# Patient Record
Sex: MALE | Race: WHITE | Employment: FULL TIME
[De-identification: names, ages, dates, MRNs, and addresses within clinical notes are randomized per-mention and may not be internally consistent; named-entity substitution may affect disease eponyms.]

---

## 2017-03-08 ENCOUNTER — OFFICE VISIT (OUTPATIENT)
Dept: UROLOGY | Facility: CLINIC | Age: 62
End: 2017-03-08

## 2017-03-08 VITALS
BODY MASS INDEX: 38.54 KG/M2 | WEIGHT: 261 LBS | TEMPERATURE: 97.9 F | DIASTOLIC BLOOD PRESSURE: 90 MMHG | SYSTOLIC BLOOD PRESSURE: 130 MMHG

## 2017-03-08 DIAGNOSIS — N13.8 BPH WITH OBSTRUCTION/LOWER URINARY TRACT SYMPTOMS: ICD-10-CM

## 2017-03-08 DIAGNOSIS — N40.1 BPH WITH OBSTRUCTION/LOWER URINARY TRACT SYMPTOMS: ICD-10-CM

## 2017-03-08 DIAGNOSIS — R31.29 MICROSCOPIC HEMATURIA: Primary | ICD-10-CM

## 2017-03-08 DIAGNOSIS — R35.0 FREQUENCY OF URINATION: ICD-10-CM

## 2017-03-08 PROCEDURE — 99204 OFFICE O/P NEW MOD 45 MIN: CPT | Performed by: UROLOGY

## 2017-03-08 ASSESSMENT — ENCOUNTER SYMPTOMS
NAUSEA: 0
SHORTNESS OF BREATH: 0
BACK PAIN: 0
WHEEZING: 0
VOMITING: 0
EYE PAIN: 0
EYE REDNESS: 0
ABDOMINAL PAIN: 0
COLOR CHANGE: 0
COUGH: 0

## 2017-03-13 ENCOUNTER — HOSPITAL ENCOUNTER (OUTPATIENT)
Dept: CT IMAGING | Age: 62
Discharge: HOME | End: 2017-03-13
Payer: COMMERCIAL

## 2017-03-13 DIAGNOSIS — R35.0 FREQUENT URINATION: ICD-10-CM

## 2017-03-13 PROCEDURE — 74178 CT ABD&PLV WO CNTR FLWD CNTR: CPT

## 2017-03-13 PROCEDURE — 6360000004 HC RX CONTRAST MEDICATION: Performed by: UROLOGY

## 2017-03-13 RX ADMIN — IOPAMIDOL 100 ML: 612 INJECTION, SOLUTION INTRAVENOUS at 15:24

## 2017-03-20 ENCOUNTER — PROCEDURE VISIT (OUTPATIENT)
Dept: UROLOGY | Age: 62
End: 2017-03-20
Payer: COMMERCIAL

## 2017-03-20 VITALS — DIASTOLIC BLOOD PRESSURE: 102 MMHG | BODY MASS INDEX: 38.4 KG/M2 | WEIGHT: 260 LBS | SYSTOLIC BLOOD PRESSURE: 150 MMHG

## 2017-03-20 DIAGNOSIS — R31.29 MICROHEMATURIA: Primary | ICD-10-CM

## 2017-03-20 PROCEDURE — 52000 CYSTOURETHROSCOPY: CPT | Performed by: UROLOGY

## 2017-03-20 PROCEDURE — 99213 OFFICE O/P EST LOW 20 MIN: CPT | Performed by: UROLOGY

## 2017-03-20 ASSESSMENT — ENCOUNTER SYMPTOMS
EYE REDNESS: 0
EYE PAIN: 0
WHEEZING: 0
ABDOMINAL PAIN: 0
SHORTNESS OF BREATH: 0
NAUSEA: 0
COLOR CHANGE: 0
VOMITING: 0
COUGH: 0
BACK PAIN: 0

## 2017-10-26 ENCOUNTER — APPOINTMENT (OUTPATIENT)
Dept: CT IMAGING | Age: 62
End: 2017-10-26
Payer: COMMERCIAL

## 2017-10-26 ENCOUNTER — HOSPITAL ENCOUNTER (OUTPATIENT)
Dept: ULTRASOUND IMAGING | Age: 62
Discharge: HOME | End: 2017-10-26
Payer: COMMERCIAL

## 2017-10-26 ENCOUNTER — HOSPITAL ENCOUNTER (EMERGENCY)
Age: 62
Discharge: HOME | End: 2017-10-26
Payer: COMMERCIAL

## 2017-10-26 VITALS
SYSTOLIC BLOOD PRESSURE: 161 MMHG | DIASTOLIC BLOOD PRESSURE: 90 MMHG | TEMPERATURE: 98.9 F | RESPIRATION RATE: 16 BRPM | HEART RATE: 78 BPM | OXYGEN SATURATION: 93 %

## 2017-10-26 DIAGNOSIS — N50.89 TESTICULAR MASS: ICD-10-CM

## 2017-10-26 DIAGNOSIS — K40.20 BILATERAL INGUINAL HERNIA WITHOUT OBSTRUCTION OR GANGRENE, RECURRENCE NOT SPECIFIED: Primary | ICD-10-CM

## 2017-10-26 LAB
ABSOLUTE EOS #: 0.5 K/UL (ref 0–0.4)
ABSOLUTE IMMATURE GRANULOCYTE: ABNORMAL K/UL (ref 0–0.3)
ABSOLUTE LYMPH #: 1.6 K/UL (ref 1–4.8)
ABSOLUTE MONO #: 0.5 K/UL (ref 0–1)
ALBUMIN SERPL-MCNC: 4.4 G/DL (ref 3.5–5.2)
ALBUMIN/GLOBULIN RATIO: 1.6 (ref 1–2.5)
ALP BLD-CCNC: 88 U/L (ref 40–129)
ALT SERPL-CCNC: 26 U/L (ref 5–41)
ANION GAP SERPL CALCULATED.3IONS-SCNC: 14 MMOL/L (ref 9–17)
AST SERPL-CCNC: 25 U/L
BASOPHILS # BLD: 0 %
BASOPHILS ABSOLUTE: 0 K/UL (ref 0–0.2)
BILIRUB SERPL-MCNC: 0.3 MG/DL (ref 0.3–1.2)
BUN BLDV-MCNC: 17 MG/DL (ref 8–23)
BUN/CREAT BLD: 19 (ref 9–20)
CALCIUM SERPL-MCNC: 9.5 MG/DL (ref 8.6–10.4)
CHLORIDE BLD-SCNC: 100 MMOL/L (ref 98–107)
CO2: 26 MMOL/L (ref 20–31)
CREAT SERPL-MCNC: 0.88 MG/DL (ref 0.7–1.2)
DIFFERENTIAL TYPE: ABNORMAL
EOSINOPHILS RELATIVE PERCENT: 8 %
GFR AFRICAN AMERICAN: >60 ML/MIN
GFR NON-AFRICAN AMERICAN: >60 ML/MIN
GFR SERPL CREATININE-BSD FRML MDRD: ABNORMAL ML/MIN/{1.73_M2}
GFR SERPL CREATININE-BSD FRML MDRD: ABNORMAL ML/MIN/{1.73_M2}
GLUCOSE BLD-MCNC: 148 MG/DL (ref 70–99)
HCT VFR BLD CALC: 45 % (ref 41–53)
HEMOGLOBIN: 14.8 G/DL (ref 13.5–17)
IMMATURE GRANULOCYTES: ABNORMAL %
LACTIC ACID, WHOLE BLOOD: ABNORMAL MMOL/L (ref 0.7–2.1)
LACTIC ACID: 2.7 MMOL/L (ref 0.5–2.2)
LYMPHOCYTES # BLD: 27 %
MCH RBC QN AUTO: 30.5 PG (ref 26–34)
MCHC RBC AUTO-ENTMCNC: 32.9 G/DL (ref 31–37)
MCV RBC AUTO: 92.6 FL (ref 80–100)
MONOCYTES # BLD: 8 %
PDW BLD-RTO: 13.3 % (ref 12.1–15.2)
PLATELET # BLD: 222 K/UL (ref 140–450)
PLATELET ESTIMATE: ABNORMAL
PMV BLD AUTO: 7 FL (ref 6–12)
POTASSIUM SERPL-SCNC: 4.1 MMOL/L (ref 3.7–5.3)
RBC # BLD: 4.86 M/UL (ref 4.5–5.9)
RBC # BLD: ABNORMAL 10*6/UL
SEG NEUTROPHILS: 57 %
SEGMENTED NEUTROPHILS ABSOLUTE COUNT: 3.5 K/UL (ref 1.8–7.7)
SODIUM BLD-SCNC: 140 MMOL/L (ref 135–144)
TOTAL PROTEIN: 7.1 G/DL (ref 6.4–8.3)
WBC # BLD: 6 K/UL (ref 3.5–11)
WBC # BLD: ABNORMAL 10*3/UL

## 2017-10-26 PROCEDURE — 74177 CT ABD & PELVIS W/CONTRAST: CPT

## 2017-10-26 PROCEDURE — 2580000003 HC RX 258: Performed by: PHYSICIAN ASSISTANT

## 2017-10-26 PROCEDURE — 93976 VASCULAR STUDY: CPT

## 2017-10-26 PROCEDURE — 80053 COMPREHEN METABOLIC PANEL: CPT

## 2017-10-26 PROCEDURE — 99283 EMERGENCY DEPT VISIT LOW MDM: CPT

## 2017-10-26 PROCEDURE — 6360000004 HC RX CONTRAST MEDICATION: Performed by: PHYSICIAN ASSISTANT

## 2017-10-26 PROCEDURE — 83605 ASSAY OF LACTIC ACID: CPT

## 2017-10-26 PROCEDURE — 36415 COLL VENOUS BLD VENIPUNCTURE: CPT

## 2017-10-26 PROCEDURE — 85025 COMPLETE CBC W/AUTO DIFF WBC: CPT

## 2017-10-26 RX ORDER — 0.9 % SODIUM CHLORIDE 0.9 %
1000 INTRAVENOUS SOLUTION INTRAVENOUS ONCE
Status: COMPLETED | OUTPATIENT
Start: 2017-10-26 | End: 2017-10-26

## 2017-10-26 RX ADMIN — IOPAMIDOL 100 ML: 612 INJECTION, SOLUTION INTRAVENOUS at 19:55

## 2017-10-26 RX ADMIN — SODIUM CHLORIDE 1000 ML: 9 INJECTION, SOLUTION INTRAVENOUS at 20:35

## 2017-10-26 ASSESSMENT — ENCOUNTER SYMPTOMS
SORE THROAT: 0
ABDOMINAL PAIN: 0
DIARRHEA: 0
WHEEZING: 0
COUGH: 0
CHEST TIGHTNESS: 0
RHINORRHEA: 0
EYE REDNESS: 0
BLOOD IN STOOL: 0
VOMITING: 0
SHORTNESS OF BREATH: 0
BACK PAIN: 0
CONSTIPATION: 0
NAUSEA: 0
EYE DISCHARGE: 0

## 2017-10-27 NOTE — ED PROVIDER NOTES
[facility] ED            [address] [place] [address]  Phone: [phone]  Fax: [phone]          Pt Name: Kym Morin  MRN: [id number]  Renzogfjerod 1955  Date of evaluation: 10/26/2017  Provider: Triston Lucas PA-C    [address] [facility]       Chief Complaint   Patient presents with    Other     Sent by PCP for CT scan to R/O bowel obstruction         HISTORY OF PRESENT ILLNESS   (Location/Symptom, Timing/Onset, Context/Setting, Quality, Duration, Modifying Factors, Severity)   Note limiting factors. Kym Morin is a 57 y.o. male who presents to the emergency department  At the request of outpatient due to ultrasound findings which were worried for possible incarcerated bowel in the left inguinal hernia. Patient reports that for the past month and a half he's had swelling in his groin region he had been seen by multiple providers for this. States that he is actively having bowel movements his last abdominal was this morning and normal.  States that he have his outpatient image done today and they called him and told him to come to the ER for further evaluation and CT. He otherwise reports he feels well he denies any fever chills denies any other abdominal pain denies any nausea vomiting. Denies any other complaints at this time. HPI    Nursing Notes were reviewed. REVIEW OF SYSTEMS    (2-9 systems for level 4, 10 or more for level 5)     Review of Systems   Constitutional: Negative for chills, diaphoresis and fever. HENT: Negative for congestion, ear pain, rhinorrhea and sore throat. Eyes: Negative for discharge, redness and visual disturbance. Respiratory: Negative for cough, chest tightness, shortness of breath and wheezing. Cardiovascular: Negative for chest pain and palpitations. Gastrointestinal: Negative for abdominal pain, blood in stool, constipation, diarrhea, nausea and vomiting. Endocrine: Negative for polydipsia, polyphagia and polyuria. Genitourinary: Negative for decreased urine volume, difficulty urinating, dysuria, frequency and hematuria. Musculoskeletal: Negative for arthralgias, back pain and myalgias. Skin: Negative for pallor and rash. Allergic/Immunologic: Negative for food allergies and immunocompromised state. Neurological: Negative for dizziness, syncope, weakness and light-headedness. Hematological: Negative for adenopathy. Does not bruise/bleed easily. Psychiatric/Behavioral: Negative for behavioral problems and suicidal ideas. The patient is not nervous/anxious. Except as noted above the remainder of the review of systems was reviewed and negative. PAST MEDICAL HISTORY     Past Medical History:   Diagnosis Date    Obesity 3/17/2014    BONI (obstructive sleep apnea) 3/17/2014    Unspecified sleep apnea          SURGICAL HISTORY       Past Surgical History:   Procedure Laterality Date    HERNIA REPAIR      umbilical    MOUTH SURGERY           CURRENT MEDICATIONS     There are no discharge medications for this patient. ALLERGIES     Chocolate and Milk-related compounds    FAMILY HISTORY       Family History   Problem Relation Age of Onset    Heart Disease Mother     Other Father           SOCIAL HISTORY       Social History     Social History    Marital status:      Spouse name: N/A    Number of children: N/A    Years of education: N/A     Social History Main Topics    Smoking status: Never Smoker    Smokeless tobacco: None    Alcohol use Yes      Comment: occ.     Drug use: No    Sexual activity: Not Asked     Other Topics Concern    None     Social History Narrative    None       SCREENINGS             PHYSICAL EXAM    (up to 7 for level 4, 8 or more for level 5)     ED Triage Vitals [10/26/17 1752]   BP Temp Temp Source Pulse Resp SpO2 Height Weight   (!) 180/89 98.9 °F (37.2 °C) Tympanic 78 16 93 % -- --       Physical Exam   Constitutional: He is oriented to person, place, and time. He appears well-developed and well-nourished. No distress. HENT:   Head: Normocephalic and atraumatic. Right Ear: External ear normal.   Left Ear: External ear normal.   Mouth/Throat: Oropharynx is clear and moist. No oropharyngeal exudate. Eyes: Conjunctivae and EOM are normal. Pupils are equal, round, and reactive to light. Right eye exhibits no discharge. Left eye exhibits no discharge. No scleral icterus. Neck: Normal range of motion. Neck supple. No tracheal deviation present. No thyromegaly present. Cardiovascular: Normal rate, regular rhythm and intact distal pulses. Exam reveals no gallop and no friction rub. No murmur heard. Pulmonary/Chest: Effort normal and breath sounds normal. No stridor. No respiratory distress. He has no wheezes. Abdominal: Soft. Bowel sounds are normal. He exhibits no distension. There is no rebound and no guarding. A hernia is present. Hernia confirmed positive in the left inguinal area. There is a left inguinal hernia palpated. I am able to reduce his somewhat but I do feel the hard mass that ultrasound was seen. This is the only area of tenderness the patient. Other wise abdominal exam is benign. Musculoskeletal: Normal range of motion. He exhibits no edema, tenderness or deformity. Lymphadenopathy:     He has no cervical adenopathy. Neurological: He is alert and oriented to person, place, and time. No cranial nerve deficit. Skin: Skin is warm and dry. No rash noted. He is not diaphoretic. No erythema. Psychiatric: He has a normal mood and affect. His behavior is normal.   Nursing note and vitals reviewed.       DIAGNOSTIC RESULTS     EKG: All EKG's are interpreted by the Emergency Department Physician who either signs or Co-signs this chart in the absence of a cardiologist.      RADIOLOGY:   Non-plain film images such as CT, Ultrasound and MRI are read by the radiologist. Plain radiographic images are visualized and preliminarily interpreted by the emergency physician with the below findings:      Interpretation per the Radiologist below, if available at the time of this note:    CT ABDOMEN PELVIS W IV CONTRAST Additional Contrast? Oral   Final Result   Impression:     1. Bilateral inguinal hernias containing only fat, larger on the left, extending into the left hemiscrotum. Left varicocele also evident. 2. Other nonacute findings above. Electronically Signed By: Frank Odell   on  10/26/2017  20:23        Impression   Left scrotal hernia mostly containing fat. There is a more hypoechoic area more in the inguinal canal that is hard and tender without peristalsis. Consider an incarcerated loop of bowel. Recommend a CT scan of the pelvis for further    evaluation. ED BEDSIDE ULTRASOUND:   Performed by ED Physician - none    LABS:  Labs Reviewed   CBC WITH AUTO DIFFERENTIAL - Abnormal; Notable for the following:        Result Value    Absolute Eos # 0.50 (*)     All other components within normal limits   COMPREHENSIVE METABOLIC PANEL - Abnormal; Notable for the following:     Glucose 148 (*)     All other components within normal limits   LACTIC ACID, PLASMA - Abnormal; Notable for the following:     Lactic Acid 2.7 (*)     All other components within normal limits       All other labs were within normal range or not returned as of this dictation. EMERGENCY DEPARTMENT COURSE and DIFFERENTIAL DIAGNOSIS/MDM:   Vitals:    Vitals:    10/26/17 1752 10/26/17 1923 10/26/17 2133   BP: (!) 180/89 (!) 133/90 (!) 161/90   Pulse: 78     Resp: 16     Temp: 98.9 °F (37.2 °C)     TempSrc: Tympanic     SpO2: 93%           MDM  15-year-old male who presents after an outpatient ultrasound was worried for a possible incarcerated hernia. On exam I do feel the palpable mass in his inguinal region. He does have some mild left scrotal swelling. But there is no erythema no evidence of torsion outpatient ultrasound just had was negative for torsion. DERICK  10/26/17 1 [facility] DERICK Stewart  10/26/17 8260

## 2017-11-29 ENCOUNTER — HOSPITAL ENCOUNTER (OUTPATIENT)
Age: 62
Setting detail: SPECIMEN
Discharge: HOME | End: 2017-11-29
Payer: COMMERCIAL

## 2017-11-29 ENCOUNTER — OFFICE VISIT (OUTPATIENT)
Dept: UROLOGY | Age: 62
End: 2017-11-29
Payer: COMMERCIAL

## 2017-11-29 VITALS
WEIGHT: 267 LBS | SYSTOLIC BLOOD PRESSURE: 142 MMHG | BODY MASS INDEX: 40.47 KG/M2 | DIASTOLIC BLOOD PRESSURE: 78 MMHG | TEMPERATURE: 98.2 F | HEIGHT: 68 IN

## 2017-11-29 DIAGNOSIS — N50.89 SCROTAL MASS: ICD-10-CM

## 2017-11-29 DIAGNOSIS — N50.89 SCROTAL MASS: Primary | ICD-10-CM

## 2017-11-29 DIAGNOSIS — K40.20 BILATERAL INGUINAL HERNIA WITHOUT OBSTRUCTION OR GANGRENE, RECURRENCE NOT SPECIFIED: ICD-10-CM

## 2017-11-29 LAB
-: ABNORMAL
AMORPHOUS: ABNORMAL
BACTERIA: ABNORMAL
BILIRUBIN URINE: NEGATIVE
CASTS UA: ABNORMAL /LPF
COLOR: YELLOW
COMMENT UA: ABNORMAL
CRYSTALS, UA: ABNORMAL /HPF
EPITHELIAL CELLS UA: ABNORMAL /HPF (ref 0–5)
GLUCOSE URINE: NEGATIVE
KETONES, URINE: NEGATIVE
LEUKOCYTE ESTERASE, URINE: NEGATIVE
MUCUS: ABNORMAL
NITRITE, URINE: NEGATIVE
OTHER OBSERVATIONS UA: ABNORMAL
PH UA: 5.5 (ref 5–9)
PROTEIN UA: NEGATIVE
RBC UA: ABNORMAL /HPF (ref 0–2)
RENAL EPITHELIAL, UA: ABNORMAL /HPF
SPECIFIC GRAVITY UA: 1.02 (ref 1.01–1.02)
TRICHOMONAS: ABNORMAL
TURBIDITY: CLEAR
URINE HGB: ABNORMAL
UROBILINOGEN, URINE: NORMAL
WBC UA: ABNORMAL /HPF (ref 0–5)
YEAST: ABNORMAL

## 2017-11-29 PROCEDURE — 99213 OFFICE O/P EST LOW 20 MIN: CPT | Performed by: UROLOGY

## 2017-11-29 PROCEDURE — 81001 URINALYSIS AUTO W/SCOPE: CPT

## 2017-11-29 PROCEDURE — 87086 URINE CULTURE/COLONY COUNT: CPT

## 2017-11-29 RX ORDER — LOSARTAN POTASSIUM AND HYDROCHLOROTHIAZIDE 12.5; 5 MG/1; MG/1
TABLET ORAL
Refills: 0 | COMMUNITY
Start: 2017-11-26 | End: 2018-03-21 | Stop reason: ALTCHOICE

## 2017-11-29 RX ORDER — TAMSULOSIN HYDROCHLORIDE 0.4 MG/1
CAPSULE ORAL
Refills: 0 | COMMUNITY
Start: 2017-11-26 | End: 2018-09-26 | Stop reason: ALTCHOICE

## 2017-11-29 ASSESSMENT — ENCOUNTER SYMPTOMS
SHORTNESS OF BREATH: 0
COLOR CHANGE: 0
BACK PAIN: 0
EYE PAIN: 0
COUGH: 0
ABDOMINAL PAIN: 0
EYE REDNESS: 0
NAUSEA: 0
VOMITING: 0
WHEEZING: 0

## 2017-11-29 NOTE — PROGRESS NOTES
medications on file prior to visit. No outpatient encounter prescriptions on file as of 11/29/2017. No facility-administered encounter medications on file as of 11/29/2017. Chocolate and Milk-related compounds  History   Smoking Status    Never Smoker   Smokeless Tobacco    Not on file       History   Alcohol Use    Yes     Comment: occ. There were no vitals filed for this visit. Constitutional: Patient in no acute distress; Neuro: alert and oriented to person place and time. Psych: Mood and affect normal.  Skin: Normal  Lungs: Respiratory effort normal  Cardiovascular:  Normal peripheral pulses  Abdomen: Soft, non-tender, non-distended with no CVA, flank pain, hepatosplenomegaly or hernia. Kidneys normal.  Bladder non-tender and not distended. Lymphatics: no palpable lymphadenopathy  Penis normal  Urethral meatus normal  Scrotal exam normal  Testicles normal bilaterally  Epididymis normal bilaterally  No evidence of inguinal hernia  Anus and perineum normal  Normal rectal tone with no masses  Prostate soft, non-tender to palpation. No palpable nodules. Estimated 40 grams. Seminal vesicles not palpable. No results found for: PSA  Lab Results   Component Value Date    BUN 17 10/26/2017     Lab Results   Component Value Date    CREATININE 0.88 10/26/2017       No results found for this visit on 11/29/17. Review of Systems   Constitutional: Negative for appetite change, chills and fever. Eyes: Negative for pain, redness and visual disturbance. Respiratory: Negative for cough, shortness of breath and wheezing. Cardiovascular: Negative for chest pain and leg swelling. Gastrointestinal: Negative for abdominal pain, nausea and vomiting. Genitourinary: Positive for testicular pain. Negative for difficulty urinating, discharge, dysuria, flank pain, frequency, hematuria and scrotal swelling. Musculoskeletal: Negative for back pain, joint swelling and myalgias.    Skin:

## 2017-12-01 LAB
CULTURE: NO GROWTH
CULTURE: NORMAL
Lab: NORMAL
Lab: NORMAL
SPECIMEN DESCRIPTION: NORMAL
SPECIMEN DESCRIPTION: NORMAL
STATUS: NORMAL

## 2018-03-21 ENCOUNTER — OFFICE VISIT (OUTPATIENT)
Dept: UROLOGY | Age: 63
End: 2018-03-21
Payer: COMMERCIAL

## 2018-03-21 VITALS — WEIGHT: 274 LBS | BODY MASS INDEX: 41.66 KG/M2 | SYSTOLIC BLOOD PRESSURE: 120 MMHG | DIASTOLIC BLOOD PRESSURE: 80 MMHG

## 2018-03-21 DIAGNOSIS — N13.8 BPH WITH OBSTRUCTION/LOWER URINARY TRACT SYMPTOMS: ICD-10-CM

## 2018-03-21 DIAGNOSIS — N40.1 BPH WITH OBSTRUCTION/LOWER URINARY TRACT SYMPTOMS: ICD-10-CM

## 2018-03-21 DIAGNOSIS — R31.29 MICROHEMATURIA: Primary | ICD-10-CM

## 2018-03-21 PROCEDURE — 99213 OFFICE O/P EST LOW 20 MIN: CPT | Performed by: UROLOGY

## 2018-03-21 RX ORDER — DUTASTERIDE 0.5 MG/1
CAPSULE, LIQUID FILLED ORAL
COMMUNITY
Start: 2018-02-20 | End: 2018-09-26 | Stop reason: ALTCHOICE

## 2018-03-21 RX ORDER — LOSARTAN POTASSIUM 50 MG/1
TABLET ORAL
COMMUNITY
Start: 2018-02-20 | End: 2018-09-26 | Stop reason: ALTCHOICE

## 2018-03-21 RX ORDER — DOXYCYCLINE 50 MG/1
50 CAPSULE ORAL DAILY
COMMUNITY
Start: 2018-02-20

## 2018-03-21 ASSESSMENT — ENCOUNTER SYMPTOMS
WHEEZING: 0
ABDOMINAL PAIN: 0
BACK PAIN: 0
NAUSEA: 0
EYE REDNESS: 0
SHORTNESS OF BREATH: 0
COUGH: 0
COLOR CHANGE: 0
VOMITING: 0
EYE PAIN: 0

## 2018-03-21 NOTE — PROGRESS NOTES
Onset    Heart Disease Mother     Other Father      Current Outpatient Prescriptions on File Prior to Visit   Medication Sig Dispense Refill    tamsulosin (FLOMAX) 0.4 MG capsule   0     No current facility-administered medications on file prior to visit. Outpatient Encounter Prescriptions as of 3/21/2018   Medication Sig Dispense Refill    losartan (COZAAR) 50 MG tablet       doxycycline monohydrate (MONODOX) 50 MG capsule       dutasteride (AVODART) 0.5 MG capsule       tamsulosin (FLOMAX) 0.4 MG capsule   0    [DISCONTINUED] losartan-hydrochlorothiazide (HYZAAR) 50-12.5 MG per tablet   0     No facility-administered encounter medications on file as of 3/21/2018. Chocolate and Milk-related compounds  History   Smoking Status    Never Smoker   Smokeless Tobacco    Never Used       History   Alcohol Use    Yes     Comment: occ. Vitals:    03/21/18 1545   BP: 120/80       Constitutional: Patient in no acute distress; Neuro: alert and oriented to person place and time. Psych: Mood and affect normal.  Skin: Normal  Lungs: Respiratory effort normal  Cardiovascular:  Normal peripheral pulses  Abdomen: Soft, non-tender, non-distended with no CVA, flank pain, hepatosplenomegaly or hernia. Kidneys normal.  Bladder non-tender and not distended. Lymphatics: no palpable lymphadenopathy  Penis normal  Urethral meatus normal  Scrotal exam normal  Testicles normal bilaterally  Epididymis normal bilaterally  No evidence of inguinal hernia    No results found for: PSA  Lab Results   Component Value Date    BUN 17 10/26/2017     Lab Results   Component Value Date    CREATININE 0.88 10/26/2017       No results found for this visit on 03/21/18. Review of Systems   Constitutional: Negative for appetite change, chills and fever. Eyes: Negative for pain, redness and visual disturbance. Respiratory: Negative for cough, shortness of breath and wheezing.     Cardiovascular: Negative for chest pain and

## 2018-09-26 ENCOUNTER — HOSPITAL ENCOUNTER (OUTPATIENT)
Age: 63
Setting detail: SPECIMEN
Discharge: HOME | End: 2018-09-26
Payer: COMMERCIAL

## 2018-09-26 ENCOUNTER — OFFICE VISIT (OUTPATIENT)
Dept: UROLOGY | Age: 63
End: 2018-09-26
Payer: COMMERCIAL

## 2018-09-26 VITALS
DIASTOLIC BLOOD PRESSURE: 90 MMHG | BODY MASS INDEX: 42.44 KG/M2 | WEIGHT: 280 LBS | HEIGHT: 68 IN | SYSTOLIC BLOOD PRESSURE: 128 MMHG

## 2018-09-26 DIAGNOSIS — N40.1 BPH WITH OBSTRUCTION/LOWER URINARY TRACT SYMPTOMS: Primary | ICD-10-CM

## 2018-09-26 DIAGNOSIS — R31.29 MICROHEMATURIA: ICD-10-CM

## 2018-09-26 DIAGNOSIS — N40.1 BPH WITH OBSTRUCTION/LOWER URINARY TRACT SYMPTOMS: ICD-10-CM

## 2018-09-26 DIAGNOSIS — N50.89 SCROTAL SWELLING: ICD-10-CM

## 2018-09-26 DIAGNOSIS — N13.8 BPH WITH OBSTRUCTION/LOWER URINARY TRACT SYMPTOMS: ICD-10-CM

## 2018-09-26 DIAGNOSIS — N13.8 BPH WITH OBSTRUCTION/LOWER URINARY TRACT SYMPTOMS: Primary | ICD-10-CM

## 2018-09-26 LAB
-: ABNORMAL
AMORPHOUS: ABNORMAL
BACTERIA: ABNORMAL
BILIRUBIN URINE: NEGATIVE
CASTS UA: ABNORMAL /LPF
COLOR: YELLOW
COMMENT UA: ABNORMAL
CRYSTALS, UA: ABNORMAL /HPF
EPITHELIAL CELLS UA: ABNORMAL /HPF (ref 0–5)
GLUCOSE URINE: NEGATIVE
KETONES, URINE: NEGATIVE
LEUKOCYTE ESTERASE, URINE: NEGATIVE
MUCUS: ABNORMAL
NITRITE, URINE: NEGATIVE
OTHER OBSERVATIONS UA: ABNORMAL
PH UA: 6 (ref 5–9)
PROTEIN UA: NEGATIVE
RBC UA: ABNORMAL /HPF (ref 0–2)
RENAL EPITHELIAL, UA: ABNORMAL /HPF
SPECIFIC GRAVITY UA: 1.02 (ref 1.01–1.02)
TRICHOMONAS: ABNORMAL
TURBIDITY: CLEAR
URINE HGB: ABNORMAL
UROBILINOGEN, URINE: NORMAL
WBC UA: ABNORMAL /HPF (ref 0–5)
YEAST: ABNORMAL

## 2018-09-26 PROCEDURE — 99214 OFFICE O/P EST MOD 30 MIN: CPT | Performed by: NURSE PRACTITIONER

## 2018-09-26 PROCEDURE — 87086 URINE CULTURE/COLONY COUNT: CPT

## 2018-09-26 PROCEDURE — 81001 URINALYSIS AUTO W/SCOPE: CPT

## 2018-09-26 PROCEDURE — 51798 US URINE CAPACITY MEASURE: CPT | Performed by: NURSE PRACTITIONER

## 2018-09-26 RX ORDER — FINASTERIDE 5 MG/1
5 TABLET, FILM COATED ORAL DAILY
Qty: 90 TABLET | Refills: 3 | Status: SHIPPED | OUTPATIENT
Start: 2018-09-26 | End: 2019-04-15 | Stop reason: SDUPTHER

## 2018-09-26 RX ORDER — MELOXICAM 15 MG/1
15 TABLET ORAL DAILY
COMMUNITY

## 2018-09-26 RX ORDER — LEVOFLOXACIN 500 MG/1
500 TABLET, FILM COATED ORAL DAILY
Qty: 10 TABLET | Refills: 0 | Status: SHIPPED | OUTPATIENT
Start: 2018-09-26 | End: 2018-10-06

## 2018-09-26 RX ORDER — AMLODIPINE BESYLATE 5 MG/1
5 TABLET ORAL DAILY
COMMUNITY

## 2018-09-26 NOTE — PROGRESS NOTES
Subjective:      Patient ID: Judith Calvo is a 57 y.o. male. HPI  Patient is a 57 y.o. male in no acute distress and alert and oriented to person, place and time. History  12/2017 Bilateral inguinal hernia surgery at [address] 12/2017. Scrotal swelling afterwards. 3/2017 microscopic hematuria. Denies history of smoking. Denies family or personal history of bladder or prostate cancer. Recent PSA was 0.32 11/2016. 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 Does have baseline LUTS of frequency, but he did admit to a large consumption of bladder irritants. CT negative. Cystoscopy showed: bilobar hyperplasia, mild trabeculation, without lesions. Prostatic varices. Today  Here today for BPH with LUTS and left scrotal swelling. He does complain of frequency every two hours,Feelings of incomplete bladder emptying and nocturia ×2-3. He does consume a large amount of bladder irritants daily. He does continue to take finasteride daily. He also complains of left scrotal swelling. He feels that this has become larger since his last visit. He continues to deny pain in the testicle. He denies dysuria, gross hematuria, flank or abdominal pain. He denies perineal pain. He denies constipation. IPSS Questionnaire (AUA-7): Over the past month    1)  How often have you had a sensation of not emptying your bladder completely after you finish urinating? 5 - Almost always   2)  How often have you had to urinate again less than two hours after you finished urinating? 3 - About half the time   3)  How often have you found you stopped and started again several times when you urinated? 0 - More than half the time   4) How difficult have you found it to postpone urination? 4 - More than half the time   5) How often have you had a weak urinary stream?  0 - Not at all   6) How often have you had to push or strain to begin urination?   0 - Not at all   7) How many times did you most typically get up to urinate from the time

## 2018-09-27 LAB
CULTURE: NORMAL
Lab: NORMAL
SPECIMEN DESCRIPTION: NORMAL
STATUS: NORMAL

## 2018-09-27 ASSESSMENT — ENCOUNTER SYMPTOMS
COLOR CHANGE: 0
NAUSEA: 0
BACK PAIN: 0
EYE REDNESS: 0
CONSTIPATION: 0
COUGH: 0
VOMITING: 0
ABDOMINAL PAIN: 0
SHORTNESS OF BREATH: 0
EYE PAIN: 0
WHEEZING: 0

## 2018-09-28 ENCOUNTER — TELEPHONE (OUTPATIENT)
Dept: UROLOGY | Age: 63
End: 2018-09-28

## 2018-10-01 ENCOUNTER — HOSPITAL ENCOUNTER (OUTPATIENT)
Dept: ULTRASOUND IMAGING | Age: 63
Discharge: HOME | End: 2018-10-03
Payer: COMMERCIAL

## 2018-10-01 DIAGNOSIS — N50.89 SCROTAL SWELLING: ICD-10-CM

## 2018-10-01 PROCEDURE — 93976 VASCULAR STUDY: CPT

## 2018-10-15 ENCOUNTER — OFFICE VISIT (OUTPATIENT)
Dept: UROLOGY | Age: 63
End: 2018-10-15
Payer: COMMERCIAL

## 2018-10-15 VITALS — DIASTOLIC BLOOD PRESSURE: 88 MMHG | BODY MASS INDEX: 42.57 KG/M2 | WEIGHT: 280 LBS | SYSTOLIC BLOOD PRESSURE: 160 MMHG

## 2018-10-15 DIAGNOSIS — N43.0 ENCYSTED HYDROCELE: Primary | ICD-10-CM

## 2018-10-15 PROCEDURE — 99214 OFFICE O/P EST MOD 30 MIN: CPT | Performed by: UROLOGY

## 2018-10-15 ASSESSMENT — ENCOUNTER SYMPTOMS
SHORTNESS OF BREATH: 0
WHEEZING: 0
NAUSEA: 0
EYE REDNESS: 0
VOMITING: 0
COLOR CHANGE: 0
ABDOMINAL PAIN: 0
BACK PAIN: 0
COUGH: 0
EYE PAIN: 0

## 2018-10-15 NOTE — PROGRESS NOTES
discharge, dysuria, flank pain, frequency, hematuria and testicular pain. Musculoskeletal: Negative for back pain, joint swelling and myalgias. Skin: Negative for color change, rash and wound. Neurological: Negative for dizziness, tremors and numbness. Hematological: Negative for adenopathy. Does not bruise/bleed easily. Objective:   Physical Exam    Assessment: This is a 70 y.o. male with the following diagnoses:   Diagnosis Orders   1. Encysted hydrocele           Plan:      I did offer patient left hydrocelectomy. Patient is unsure if his symptoms warrant this. I did give him a six-month follow-up today. If he does change his mind and surgery he will call. We will then need to schedule preoperative testing for him.wwe did go over the risks and benefits of the procedure today. If patient does wish to have the procedure and would feel comfortable completing the procedure without seeing the patient again.         Everardo Martínez MD

## 2019-04-15 ENCOUNTER — OFFICE VISIT (OUTPATIENT)
Dept: UROLOGY | Age: 64
End: 2019-04-15
Payer: COMMERCIAL

## 2019-04-15 VITALS
SYSTOLIC BLOOD PRESSURE: 158 MMHG | HEIGHT: 68 IN | WEIGHT: 272 LBS | DIASTOLIC BLOOD PRESSURE: 82 MMHG | BODY MASS INDEX: 41.22 KG/M2

## 2019-04-15 DIAGNOSIS — R31.29 MICROHEMATURIA: Primary | ICD-10-CM

## 2019-04-15 DIAGNOSIS — N40.1 BPH WITH OBSTRUCTION/LOWER URINARY TRACT SYMPTOMS: ICD-10-CM

## 2019-04-15 DIAGNOSIS — N13.8 BPH WITH OBSTRUCTION/LOWER URINARY TRACT SYMPTOMS: ICD-10-CM

## 2019-04-15 PROCEDURE — 99213 OFFICE O/P EST LOW 20 MIN: CPT | Performed by: NURSE PRACTITIONER

## 2019-04-15 PROCEDURE — 1036F TOBACCO NON-USER: CPT | Performed by: NURSE PRACTITIONER

## 2019-04-15 PROCEDURE — G8427 DOCREV CUR MEDS BY ELIG CLIN: HCPCS | Performed by: NURSE PRACTITIONER

## 2019-04-15 PROCEDURE — 4040F PNEUMOC VAC/ADMIN/RCVD: CPT | Performed by: NURSE PRACTITIONER

## 2019-04-15 PROCEDURE — G8417 CALC BMI ABV UP PARAM F/U: HCPCS | Performed by: NURSE PRACTITIONER

## 2019-04-15 PROCEDURE — 1123F ACP DISCUSS/DSCN MKR DOCD: CPT | Performed by: NURSE PRACTITIONER

## 2019-04-15 PROCEDURE — 3017F COLORECTAL CA SCREEN DOC REV: CPT | Performed by: NURSE PRACTITIONER

## 2019-04-15 RX ORDER — TURMERIC 400 MG
CAPSULE ORAL
COMMUNITY

## 2019-04-15 RX ORDER — FINASTERIDE 5 MG/1
5 TABLET, FILM COATED ORAL DAILY
Qty: 90 TABLET | Refills: 3 | Status: SHIPPED | OUTPATIENT
Start: 2019-04-15

## 2019-04-15 ASSESSMENT — ENCOUNTER SYMPTOMS
CONSTIPATION: 0
EYE REDNESS: 0
EYE PAIN: 0
NAUSEA: 0
WHEEZING: 0
COLOR CHANGE: 0
BACK PAIN: 0
VOMITING: 0
SHORTNESS OF BREATH: 0
COUGH: 0
ABDOMINAL PAIN: 0

## 2019-04-15 NOTE — PROGRESS NOTES
HPI:    Patient is a 70 y.o. male in no acute distress. He is alert and oriented to person, place, and time. History  12/2017 Bilateral inguinal hernia surgery at [facility] 12/2017. Scrotal swelling afterwards.       3/2017 microscopic hematuria. Denies history of smoking. Denies family or personal history of bladder or prostate cancer. Recent PSA was 0.32 11/2016. 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 CT negative. Cystoscopy showed: bilobar hyperplasia, mild trabeculation, without lesions. Prostatic varices. 3/2019 Hydrocelectomy with Dr. Jared Draper    Today  Here today for a 6 month follow-up for BPH. He has been on finasteride for 1 year. He is now urinating every 3 hours during the day. He has nocturia ×1. He denies urgency or frequency. He denies any episodes of gross hematuria or dysuria. Past Medical History:   Diagnosis Date    Obesity 3/17/2014    BONI (obstructive sleep apnea) 3/17/2014    Unspecified sleep apnea      Past Surgical History:   Procedure Laterality Date    HERNIA REPAIR      umbilical    HYDROCELE EXCISION      INGUINAL HERNIA REPAIR Left 12/28/2017    Dr. Gina Gallegos in [facility]       Outpatient Encounter Medications as of 4/15/2019   Medication Sig Dispense Refill    Turmeric 400 MG CAPS Take by mouth Indications: PRN      finasteride (PROSCAR) 5 MG tablet Take 1 tablet by mouth daily 90 tablet 3    amLODIPine (NORVASC) 5 MG tablet Take 5 mg by mouth daily      meloxicam (MOBIC) 15 MG tablet Take 15 mg by mouth daily Daily, prn      NONFORMULARY Eliecer Red-take one tablet daily      doxycycline monohydrate (MONODOX) 50 MG capsule 50 mg daily       [DISCONTINUED] finasteride (PROSCAR) 5 MG tablet Take 1 tablet by mouth daily 90 tablet 3     No facility-administered encounter medications on file as of 4/15/2019.        Current Outpatient non-tender, non-distended with no CVA, flank pain, hepatosplenomegaly or hernia. Kidneys normal.  Bladder non-tender and not distended. Lymphatics: no palpable lymphadenopathy  Penis normal  Urethral meatus normal  Scrotal exam normal  Testicles normal bilaterally  Epididymis normal bilaterally  No evidence of inguinal hernia        Lab Results   Component Value Date    BUN 17 10/26/2017     Lab Results   Component Value Date    CREATININE 0.88 10/26/2017     No results found for: PSA    ASSESSMENT:   Diagnosis Orders   1. Microhematuria     2. BPH with obstruction/lower urinary tract symptoms  finasteride (PROSCAR) 5 MG tablet           PLAN:  He will continue finasteride daily. UA was negative for significant hematuria and 9/2018. We'll plan to see him in 1 year or sooner if needed for new or worsening symptoms.

## 2019-04-15 NOTE — PATIENT INSTRUCTIONS
SURVEY:    You may be receiving a survey from [employer] regarding your visit today. Please complete the survey to enable us to provide the highest quality of care to you and your family. If you cannot score us a very good on any question, please call the office to discuss how we could have made your experience a very good one. Thank you.

## 2022-10-28 ENCOUNTER — HOSPITAL ENCOUNTER (OUTPATIENT)
Dept: CT IMAGING | Age: 67
Discharge: HOME | End: 2022-10-30
Payer: MEDICARE

## 2022-10-28 ENCOUNTER — HOSPITAL ENCOUNTER (OUTPATIENT)
Age: 67
Discharge: HOME | End: 2022-10-28
Payer: MEDICARE

## 2022-10-28 DIAGNOSIS — R10.31 ABDOMINAL PAIN, RIGHT LOWER QUADRANT: ICD-10-CM

## 2022-10-28 LAB
ALBUMIN SERPL-MCNC: 4.3 G/DL (ref 3.5–5.2)
ALBUMIN/GLOBULIN RATIO: 1.4 (ref 1–2.5)
ALP BLD-CCNC: 99 U/L (ref 40–129)
ALT SERPL-CCNC: 24 U/L (ref 5–41)
ANION GAP SERPL CALCULATED.3IONS-SCNC: 9 MMOL/L (ref 9–17)
AST SERPL-CCNC: 23 U/L
BILIRUB SERPL-MCNC: 0.7 MG/DL (ref 0.3–1.2)
BUN BLDV-MCNC: 15 MG/DL (ref 8–23)
BUN/CREAT BLD: 17 (ref 9–20)
CALCIUM SERPL-MCNC: 9.4 MG/DL (ref 8.6–10.4)
CHLORIDE BLD-SCNC: 104 MMOL/L (ref 98–107)
CO2: 29 MMOL/L (ref 20–31)
CREAT SERPL-MCNC: 0.86 MG/DL (ref 0.7–1.2)
GFR SERPL CREATININE-BSD FRML MDRD: >60 ML/MIN/1.73M2
GLUCOSE BLD-MCNC: 110 MG/DL (ref 70–99)
HCT VFR BLD CALC: 48.9 % (ref 40.7–50.3)
HEMOGLOBIN: 16.2 G/DL (ref 13–17)
MCH RBC QN AUTO: 32.2 PG (ref 25.2–33.5)
MCHC RBC AUTO-ENTMCNC: 33.1 G/DL (ref 28.4–34.8)
MCV RBC AUTO: 97.2 FL (ref 82.6–102.9)
NRBC AUTOMATED: 0 PER 100 WBC
PDW BLD-RTO: 12.5 % (ref 11.8–14.4)
PLATELET # BLD: 177 K/UL (ref 138–453)
PMV BLD AUTO: 8.9 FL (ref 8.1–13.5)
POTASSIUM SERPL-SCNC: 4.4 MMOL/L (ref 3.7–5.3)
RBC # BLD: 5.03 M/UL (ref 4.21–5.77)
SODIUM BLD-SCNC: 142 MMOL/L (ref 135–144)
TOTAL PROTEIN: 7.4 G/DL (ref 6.4–8.3)
WBC # BLD: 6.9 K/UL (ref 3.5–11.3)

## 2022-10-28 PROCEDURE — 80053 COMPREHEN METABOLIC PANEL: CPT

## 2022-10-28 PROCEDURE — 36415 COLL VENOUS BLD VENIPUNCTURE: CPT

## 2022-10-28 PROCEDURE — 6360000004 HC RX CONTRAST MEDICATION: Performed by: NURSE PRACTITIONER

## 2022-10-28 PROCEDURE — 85027 COMPLETE CBC AUTOMATED: CPT

## 2022-10-28 PROCEDURE — 74177 CT ABD & PELVIS W/CONTRAST: CPT

## 2022-10-28 RX ADMIN — IOPAMIDOL 75 ML: 755 INJECTION, SOLUTION INTRAVENOUS at 13:44

## 2024-03-11 ENCOUNTER — TRANSCRIBE ORDERS (OUTPATIENT)
Dept: ADMINISTRATIVE | Age: 69
End: 2024-03-11

## 2024-03-11 DIAGNOSIS — R10.32 ABDOMINAL PAIN, LEFT LOWER QUADRANT: Primary | ICD-10-CM

## 2024-03-11 DIAGNOSIS — R10.11 ABDOMINAL PAIN, RIGHT UPPER QUADRANT: ICD-10-CM

## 2024-03-27 ENCOUNTER — HOSPITAL ENCOUNTER (OUTPATIENT)
Dept: ULTRASOUND IMAGING | Age: 69
Discharge: HOME | End: 2024-03-29
Payer: MEDICARE

## 2024-03-27 DIAGNOSIS — R10.11 ABDOMINAL PAIN, RIGHT UPPER QUADRANT: ICD-10-CM

## 2024-03-27 DIAGNOSIS — R10.32 ABDOMINAL PAIN, LEFT LOWER QUADRANT: ICD-10-CM

## 2024-03-27 PROCEDURE — 76705 ECHO EXAM OF ABDOMEN: CPT

## 2024-06-21 ENCOUNTER — HOSPITAL ENCOUNTER (OUTPATIENT)
Age: 69
Discharge: HOME | End: 2024-06-21
Payer: MEDICARE

## 2024-06-21 LAB
ALBUMIN SERPL-MCNC: 4 G/DL (ref 3.5–5.2)
ALBUMIN/GLOB SERPL: 1.3 {RATIO} (ref 1–2.5)
ALP SERPL-CCNC: 98 U/L (ref 40–129)
ALT SERPL-CCNC: 17 U/L (ref 5–41)
ANION GAP SERPL CALCULATED.3IONS-SCNC: 9 MMOL/L (ref 9–17)
AST SERPL-CCNC: 21 U/L
BASOPHILS # BLD: 0.03 K/UL (ref 0–0.2)
BASOPHILS NFR BLD: 1 % (ref 0–2)
BILIRUB SERPL-MCNC: 0.3 MG/DL (ref 0.3–1.2)
BUN SERPL-MCNC: 18 MG/DL (ref 8–23)
BUN/CREAT SERPL: 20 (ref 9–20)
CALCIUM SERPL-MCNC: 9.4 MG/DL (ref 8.6–10.4)
CHLORIDE SERPL-SCNC: 108 MMOL/L (ref 98–107)
CO2 SERPL-SCNC: 30 MMOL/L (ref 20–31)
CREAT SERPL-MCNC: 0.9 MG/DL (ref 0.7–1.2)
EOSINOPHIL # BLD: 0.26 K/UL (ref 0–0.44)
EOSINOPHILS RELATIVE PERCENT: 4 % (ref 1–4)
ERYTHROCYTE [DISTWIDTH] IN BLOOD BY AUTOMATED COUNT: 13.6 % (ref 11.8–14.4)
GFR, ESTIMATED: >90 ML/MIN/1.73M2
GLUCOSE SERPL-MCNC: 109 MG/DL (ref 70–99)
HCT VFR BLD AUTO: 42.9 % (ref 40.7–50.3)
HGB BLD-MCNC: 13.5 G/DL (ref 13–17)
IMM GRANULOCYTES # BLD AUTO: <0.03 K/UL (ref 0–0.3)
IMM GRANULOCYTES NFR BLD: 0 %
LYMPHOCYTES NFR BLD: 1.33 K/UL (ref 1.1–3.7)
LYMPHOCYTES RELATIVE PERCENT: 22 % (ref 24–43)
MCH RBC QN AUTO: 30.3 PG (ref 25.2–33.5)
MCHC RBC AUTO-ENTMCNC: 31.5 G/DL (ref 28.4–34.8)
MCV RBC AUTO: 96.2 FL (ref 82.6–102.9)
MONOCYTES NFR BLD: 0.75 K/UL (ref 0.1–1.2)
MONOCYTES NFR BLD: 12 % (ref 3–12)
NEUTROPHILS NFR BLD: 61 % (ref 36–65)
NEUTS SEG NFR BLD: 3.66 K/UL (ref 1.5–8.1)
NRBC BLD-RTO: 0 PER 100 WBC
PLATELET # BLD AUTO: 214 K/UL (ref 138–453)
PMV BLD AUTO: 9 FL (ref 8.1–13.5)
POTASSIUM SERPL-SCNC: 4.6 MMOL/L (ref 3.7–5.3)
PROT SERPL-MCNC: 7.1 G/DL (ref 6.4–8.3)
RBC # BLD AUTO: 4.46 M/UL (ref 4.21–5.77)
SODIUM SERPL-SCNC: 147 MMOL/L (ref 135–144)
WBC OTHER # BLD: 6.1 K/UL (ref 3.5–11.3)

## 2024-06-21 PROCEDURE — 85025 COMPLETE CBC W/AUTO DIFF WBC: CPT

## 2024-06-21 PROCEDURE — 80053 COMPREHEN METABOLIC PANEL: CPT

## 2024-06-21 PROCEDURE — 36415 COLL VENOUS BLD VENIPUNCTURE: CPT

## 2024-07-11 ENCOUNTER — HOSPITAL ENCOUNTER (OUTPATIENT)
Dept: PHYSICAL THERAPY | Age: 69
Setting detail: THERAPIES SERIES
Discharge: HOME | End: 2024-07-11

## 2024-07-16 ENCOUNTER — HOSPITAL ENCOUNTER (OUTPATIENT)
Dept: PHYSICAL THERAPY | Age: 69
Setting detail: THERAPIES SERIES
Discharge: HOME | End: 2024-07-16
Payer: MEDICARE

## 2024-07-16 PROCEDURE — 97110 THERAPEUTIC EXERCISES: CPT

## 2024-07-16 PROCEDURE — 97016 VASOPNEUMATIC DEVICE THERAPY: CPT

## 2024-07-16 PROCEDURE — 97140 MANUAL THERAPY 1/> REGIONS: CPT

## 2024-07-16 NOTE — PROGRESS NOTES
Goal 2: Patient will be fitted for compression pumps prn to manage lymphedema  Long Term Goal 3: Patient will present with decreased edema of arcadio LEs by 2-3 cm, in circumference, to return to PLOF    Patient Goals : decrease leg swelling      Minutes Tracking:  Time In: 0920  Time Out: 1023  Minutes: 63       Sangeetha Siu PTA,    Date: 7/16/2024  Electronically signed by Sangeetha Siu PTA on 7/16/2024 at 11:29 AM

## 2024-07-18 ENCOUNTER — HOSPITAL ENCOUNTER (OUTPATIENT)
Dept: PHYSICAL THERAPY | Age: 69
Setting detail: THERAPIES SERIES
Discharge: HOME | End: 2024-07-18
Payer: MEDICARE

## 2024-07-18 PROCEDURE — 97016 VASOPNEUMATIC DEVICE THERAPY: CPT

## 2024-07-18 PROCEDURE — 97110 THERAPEUTIC EXERCISES: CPT

## 2024-07-18 PROCEDURE — 97140 MANUAL THERAPY 1/> REGIONS: CPT

## 2024-07-18 NOTE — PROGRESS NOTES
Phone: [phone]                       [facility]          Fax: [phone]                      Outpatient Physical Therapy                                                             Lymphedema Treatment  Date: 2024  Patient: Edmundo Wagner  : 1955  CSN #: [id number]  Referring Physician: Referring Provider (secondary): Jessica Rodriguez NP    Diagnosis: Lymphedema I89.0, Cellulitis and abscess of leg L03.119, L02.419, [place]rington LE edema R 60.0    Treatment Diagnosis: difficulty walking  Onset Date: 24  PT Insurance Information: Medicare/Medicaid  Total # of Visits Approved: 8   Total # of Visits to Date: 3  No Show: 0  Canceled Appointment: 0     Subjective  Subjective: Pt. reports no change in LEs and denies pain at this time.  Additional Pertinent Hx: HTN, Bladder, arthritis, circulation problems      Skin Integumentary:   Skin Integrity: Intact  Pitting Area 1 Described: barrington LEs  Pitting Scale Area 1: N/A ([place])  Skin Color: Red, Hyperpigmentation, Purple  Skin Condition/Temp: Dry, Flaky  Turgor: Shiney/hard  Edema Rebound: Slow  Stemmer Sign: Positive  Scars Present: No    Signs of Constriction (if applicable):   Skin Breakdown: Yes  Papilloma (benign tumor arising from an epithelial layer): No  Fibrotic Areas: No  Lymphorrhea: Yes  Warts: No  Ulcers: No    Stage of Lymphedema: Stage 2: Spontaneously Irreversible Stage  Description: Stage 2 irreversible stage    Lymphedema Classification:   Type: Secondary Lymphedema  Left: Severe     Right:      Measurements:       Right Measurements Left Measurements   R LE Pre Girth Measurement (cm)  5th Tuberosity (cm): 30  Calf (cm): 41.7  Mid Knee (cm): 49.5  Thigh (cm): 53.8  Total Girth (cm): 175 L LE Pre Girth Measurement (cm)  5th Tuberosity (cm): 31.6  Calf (cm): 49.3  Mid Knee (cm): 48.1  Thigh (cm): 57.2  Total Girth (cm): 186.2         Exercises:  Exercise 1: HEP: low sodium diet, daily exercise, compression 20-30mmHg,

## 2024-07-22 ENCOUNTER — HOSPITAL ENCOUNTER (OUTPATIENT)
Dept: PHYSICAL THERAPY | Age: 69
Setting detail: THERAPIES SERIES
Discharge: HOME | End: 2024-07-22
Payer: MEDICARE

## 2024-07-22 PROCEDURE — 97016 VASOPNEUMATIC DEVICE THERAPY: CPT

## 2024-07-22 PROCEDURE — 97140 MANUAL THERAPY 1/> REGIONS: CPT

## 2024-07-22 PROCEDURE — 97110 THERAPEUTIC EXERCISES: CPT

## 2024-07-22 NOTE — PROGRESS NOTES
Phone: [phone]                       [facility]          Fax: [phone]                      Outpatient Physical Therapy                                                             Lymphedema Treatment  Date: 2024  Patient: Edmundo Wagner  : 1955  CSN #: [id number]  Referring Physician: Referring Provider (secondary): Jessica Rodriguez NP    Diagnosis: Lymphedema I89.0, Cellulitis and abscess of leg L03.119, L02.419, [place]io LE edema R 60.0    Treatment Diagnosis: difficulty walking  Onset Date: 24  PT Insurance Information: Medicare/Medicaid  Total # of Visits Approved: 8   Total # of Visits to Date: 5  No Show: 0  Canceled Appointment: 0     Subjective  Subjective: Pt. denies pain stated he is starting to note a slight difference in jj in LEs.  Additional Pertinent Hx: HTN, Bladder, arthritis, circulation problems    Skin Integumentary:   Skin Integrity: Intact  Pitting Area 1 Described: arcadio LEs  Pitting Scale Area 1: N/A ([place])  Skin Color: Hyperpigmentation, Red, Purple  Skin Condition/Temp: Dry  Turgor: Shiney/hard  Edema Rebound: Slow  Stemmer Sign: Positive  Scars Present: No    Signs of Constriction (if applicable):   Skin Breakdown: Yes  Skin Breakdown Size: abrasion  Skin Breakdown Location: medial heel  Skin Breakdown Number: 1  Fistulas / Abnormal [place]: No  Papilloma (benign tumor arising from an epithelial layer): No  Fibrotic Areas: No  Lymphorrhea: Yes  Warts: No  Ulcers: No    Stage of Lymphedema: Stage 2: Spontaneously Irreversible Stage  Description: Stage 2 irreversible stage    Lymphedema Classification:   Type: Secondary Lymphedema  Left: Severe     Right: Severe    Measurements:        Right Measurements Left Measurements   R LE Pre Girth Measurement (cm)  5th Tuberosity (cm): 29.3  Lateral malleolus: 32.7  Calf (cm): 44.3  Thigh (cm): 55  Total Girth (cm): 161.3 L LE Pre Girth Measurement (cm)  5th Tuberosity (cm): 31  Lateral malleolus: 37  Calf

## 2024-07-23 ENCOUNTER — HOSPITAL ENCOUNTER (OUTPATIENT)
Dept: PHYSICAL THERAPY | Age: 69
Setting detail: THERAPIES SERIES
End: 2024-07-23
Payer: MEDICARE

## 2024-07-25 ENCOUNTER — HOSPITAL ENCOUNTER (OUTPATIENT)
Dept: PHYSICAL THERAPY | Age: 69
Setting detail: THERAPIES SERIES
Discharge: HOME | End: 2024-07-25
Payer: MEDICARE

## 2024-07-25 PROCEDURE — 97140 MANUAL THERAPY 1/> REGIONS: CPT

## 2024-07-25 PROCEDURE — 97110 THERAPEUTIC EXERCISES: CPT

## 2024-07-25 PROCEDURE — 97016 VASOPNEUMATIC DEVICE THERAPY: CPT

## 2024-07-25 NOTE — PROGRESS NOTES
Phone: [phone]                       [facility]          Fax: [phone]                      Outpatient Physical Therapy                                                             Lymphedema Treatment  Date: 2024  Patient: Edmundo Wagner  : 1955  CSN #: [id number]  Referring Physician: Referring Provider (secondary): Jessica Rodriguez NP    Diagnosis: Lymphedema I89.0, Cellulitis and abscess of leg L03.119, L02.419, Barrington LE edema R 60.0    Treatment Diagnosis: difficulty walking  Onset Date: 24  PT Insurance Information: Medicare/Medicaid  Total # of Visits Approved: 8   Total # of Visits to Date: 6  No Show: 0  Canceled Appointment: 0     Subjective  Subjective: Pt reports pain no different than usual  Additional Pertinent Hx: HTN, Bladder, arthritis, circulation problems    Lymph Assessment  Skin Integumentary:   Pitting Area 1 Described: barrington LEs  Pitting Scale Area 1: N/A (Brawny)  Skin Color: Red, Purple, Hyperpigmentation  Skin Condition/Temp: Dry  Turgor: Shiney/hard  Conditions to nail beds: Thick  Edema Rebound: Slow  Stemmer Sign: Positive  Scars Present: No    Signs of Constriction (if applicable):   Skin Breakdown: Yes  Skin Breakdown Size: abrasion  Skin Breakdown Location: medial heel  Skin Breakdown Number: 1  Fistulas / Abnormal Passageway: No  Papilloma (benign tumor arising from an epithelial layer): No  Fibrotic Areas: No  Lymphorrhea: No  Warts: No  Ulcers: No    Stage of Lymphedema: Stage 2: Spontaneously Irreversible Stage  Description: Stage 2 irreversible stage    Lymphedema Classification:   Type: Secondary Lymphedema  Left: Severe     Right: Severe      Exercises:  Exercise 1: HEP: low sodium diet, daily exercise, compression 20-30mmHg, elevation  Exercise 2: Seated exercises B LE x15  Exercise 3: STS x10    Manual:  Other: MLD to B LEs     Skin Integumentary  Skin Color: Red, Purple, Hyperpigmentation  Skin Condition/Temp: Dry  Turgor:

## 2024-07-29 ENCOUNTER — HOSPITAL ENCOUNTER (OUTPATIENT)
Dept: PHYSICAL THERAPY | Age: 69
Setting detail: THERAPIES SERIES
Discharge: HOME | End: 2024-07-29
Payer: MEDICARE

## 2024-07-29 PROCEDURE — 97016 VASOPNEUMATIC DEVICE THERAPY: CPT

## 2024-07-29 PROCEDURE — 97140 MANUAL THERAPY 1/> REGIONS: CPT

## 2024-07-29 PROCEDURE — 97110 THERAPEUTIC EXERCISES: CPT

## 2024-07-29 NOTE — PROGRESS NOTES
Phone: [phone]                       [facility]          Fax: [phone]                      Outpatient Physical Therapy                                                             Lymphedema Treatment  Date: 2024  Patient: Edmundo Wagner  : 1955  CSN #: [id number]  Referring Physician: Referring Provider (secondary): Jessica Rodriguez NP    Diagnosis: Lymphedema I89.0, Cellulitis and abscess of leg L03.119, L02.419, Barrington LE edema R 60.0    Treatment Diagnosis: difficulty walking  Onset Date: 24  PT Insurance Information: Medicare/Medicaid  Total # of Visits Approved: 8   Total # of Visits to Date: 7  No Show: 0  Canceled Appointment: 0     Subjective  Subjective: Patient reports no change in swelling yet, and is most concerned about getting the swelling out of his feet so he can wear shoes.  Additional Pertinent Hx: HTN, Bladder, arthritis, circulation problems    Lymph Assessment    Skin Integumentary:   Skin Integrity: Intact  Pitting Area 1 Described: barrington LEs  Pitting Scale Area 1: N/A (Brawny)  Skin Color: Red, Purple, Hyperpigmentation  Skin Condition/Temp: Dry  Turgor: Shiney/hard  Conditions to nail beds: Thick  Edema Rebound: Slow  Stemmer Sign: Positive  Scars Present: No    Signs of Constriction (if applicable):   Skin Breakdown: Yes  Skin Breakdown Size: abrasion  Skin Breakdown Location: medial heel  Skin Breakdown Number: 1  Fistulas / Abnormal Passageway: No  Papilloma (benign tumor arising from an epithelial layer): No  Fibrotic Areas: No  Lymphorrhea: No  Warts: No  Ulcers: No    Stage of Lymphedema: Stage 2: Spontaneously Irreversible Stage  Description: Stage 2 irreversible stage    Lymphedema Classification:   Type: Secondary Lymphedema  Left: Severe     Right: Severe    Exercises:  Exercise 1: HEP: low sodium diet, daily exercise, compression 20-30mmHg, elevation  Exercise 2: Seated exercises B LE x20  Exercise 3: STS x10    Manual:  Other: MLD to B LEs

## 2024-08-01 ENCOUNTER — HOSPITAL ENCOUNTER (OUTPATIENT)
Dept: PHYSICAL THERAPY | Age: 69
Setting detail: THERAPIES SERIES
Discharge: HOME | End: 2024-08-01
Payer: MEDICARE

## 2024-08-01 PROCEDURE — 97110 THERAPEUTIC EXERCISES: CPT

## 2024-08-01 PROCEDURE — 97140 MANUAL THERAPY 1/> REGIONS: CPT

## 2024-08-01 PROCEDURE — 97016 VASOPNEUMATIC DEVICE THERAPY: CPT

## 2024-08-01 NOTE — PROGRESS NOTES
Tracking:  Time In: 1159  Time Out: 1247  Minutes: 48    Mirian Teran PTA,    Date: 8/1/2024  Electronically signed by Mirian Teran PTA on 8/1/2024 at 1:12 PM

## 2024-08-05 ENCOUNTER — HOSPITAL ENCOUNTER (OUTPATIENT)
Dept: PHYSICAL THERAPY | Age: 69
Setting detail: THERAPIES SERIES
Discharge: HOME | End: 2024-08-05
Payer: MEDICARE

## 2024-08-05 PROCEDURE — 97016 VASOPNEUMATIC DEVICE THERAPY: CPT

## 2024-08-05 PROCEDURE — 97110 THERAPEUTIC EXERCISES: CPT

## 2024-08-05 PROCEDURE — 97140 MANUAL THERAPY 1/> REGIONS: CPT

## 2024-08-05 NOTE — PROGRESS NOTES
Phone: [phone]                       [facility]          Fax: [phone]                      Outpatient Physical Therapy                                                             Lymphedema Treatment  Date: 2024  Patient: Edmundo Wagner  : 1955  CSN #: [id number]  Referring Physician: Referring Provider (secondary): Jessica Rodriguez NP    Diagnosis: Lymphedema I89.0, Cellulitis and abscess of leg L03.119, L02.419, Barrington LE edema R 60.0    Treatment Diagnosis: difficulty walking  Onset Date: 24  PT Insurance Information: Medicare/Medicaid  Total # of Visits Approved: 8   Total # of Visits to Date: 9  No Show: 0  Canceled Appointment: 0     Subjective  Subjective: Pt reports no changes this date and states he is not feeling any different  Additional Pertinent Hx: HTN, Bladder, arthritis, circulation problems    Lymph Assessment  Skin Integumentary:   Skin Integrity: Intact  Pitting Area 1 Described: barrington LEs  Pitting Scale Area 1: N/A (Brawny)  Skin Color: Hyperpigmentation, Red, Purple  Skin Condition/Temp: Dry  Turgor: Shiney/hard  Hair Growth: Absent  Edema Rebound: Slow  Stemmer Sign: Positive  Scars Present: No    Signs of Constriction (if applicable):   Skin Breakdown: Yes  Skin Breakdown Size: abrasion  Skin Breakdown Location: medial heel  Skin Breakdown Number: 1  Fistulas / Abnormal Passageway: No  Papilloma (benign tumor arising from an epithelial layer): No  Fibrotic Areas: No  Lymphorrhea: No  Warts: No  Ulcers: No    Stage of Lymphedema: Stage 2: Spontaneously Irreversible Stage  Description: Stage 2 irreversible stage    Lymphedema Classification:   Type: Secondary Lymphedema  Left: Severe     Right: Severe    Exercises:  Exercise 1: HEP: low sodium diet, daily exercise, compression 20-30mmHg, elevation  Exercise 2: Seated exercises B LE x20  Exercise 3: STS 2x10    Manual:  Other: MLD to B LEs     Skin Integumentary  Skin Color: Hyperpigmentation, Red,

## 2024-08-08 ENCOUNTER — HOSPITAL ENCOUNTER (OUTPATIENT)
Dept: PHYSICAL THERAPY | Age: 69
Setting detail: THERAPIES SERIES
Discharge: HOME | End: 2024-08-08
Payer: MEDICARE

## 2024-08-08 PROCEDURE — 97016 VASOPNEUMATIC DEVICE THERAPY: CPT

## 2024-08-08 PROCEDURE — 97140 MANUAL THERAPY 1/> REGIONS: CPT

## 2024-08-08 PROCEDURE — 97110 THERAPEUTIC EXERCISES: CPT

## 2024-08-08 NOTE — PROGRESS NOTES
Phone: [phone]                       [facility]          Fax: [phone]                      Outpatient Physical Therapy                                                             Lymphedema Treatment  Date: 2024  Patient: Edmundo Wagner  : 1955  CSN #: [id number]  Referring Physician: Referring Provider (secondary): Jessica Rodriguez NP    Diagnosis: Lymphedema I89.0, Cellulitis and abscess of leg L03.119, L02.419, Barrington LE edema R 60.0    Treatment Diagnosis: difficulty walking  Onset Date: 24  PT Insurance Information: Medicare/Medicaid  Total # of Visits Approved: 8   Total # of Visits to Date: 10  No Show: 0  Canceled Appointment: 0     Subjective  Subjective: Pt reports his heel is sore today and thinks he may have a bone spur. Pt reports no complaints with compression last visit  Additional Pertinent Hx: HTN, Bladder, arthritis, circulation problems    Lymph Assessment  Skin Integumentary:   Skin Integrity: Intact  Pitting Area 1 Described: barrington LEs  Pitting Scale Area 1: N/A (Brawny)  Skin Color: Hyperpigmentation, Purple, Red  Skin Condition/Temp: Dry  Turgor: Shiney/hard  Hair Growth: Absent  Edema Rebound: Slow  Stemmer Sign: Positive  Scars Present: No    Signs of Constriction (if applicable):   Skin Breakdown: Yes  Skin Breakdown Size: abrasion  Skin Breakdown Location: medial heel  Skin Breakdown Number: 1  Fistulas / Abnormal Passageway: No  Papilloma (benign tumor arising from an epithelial layer): No  Fibrotic Areas: No  Lymphorrhea: No  Warts: No  Ulcers: No    Stage of Lymphedema: Stage 2: Spontaneously Irreversible Stage  Description: Stage 2 irreversible stage    Lymphedema Classification:   Type: Secondary Lymphedema  Left: Severe     Right: Severe    Measurements: Area Measured: R LE, L LE, Trunk (abdominal: 136.5 cm)      Right Measurements Left Measurements   R LE Pre Girth Measurement (cm)  5th Tuberosity (cm): 30.5  Lateral malleolus: 31  Calf (cm):

## 2024-08-14 ENCOUNTER — HOSPITAL ENCOUNTER (OUTPATIENT)
Dept: PHYSICAL THERAPY | Age: 69
Setting detail: THERAPIES SERIES
Discharge: HOME | End: 2024-08-14
Payer: MEDICARE

## 2024-08-14 PROCEDURE — 97110 THERAPEUTIC EXERCISES: CPT

## 2024-08-14 PROCEDURE — 97140 MANUAL THERAPY 1/> REGIONS: CPT

## 2024-08-14 PROCEDURE — 97016 VASOPNEUMATIC DEVICE THERAPY: CPT

## 2024-08-14 NOTE — PROGRESS NOTES
Phone: [phone]                       [facility]          Fax: [phone]                      Outpatient Physical Therapy                                                             Lymphedema Treatment  Date: 2024  Patient: Edmundo Wagner  : 1955  CSN #: [id number]  Referring Physician: Referring Provider (secondary): Jessica Rodriguez NP    Diagnosis: Lymphedema I89.0, Cellulitis and abscess of leg L03.119, L02.419, Barrington LE edema R 60.0    Treatment Diagnosis: difficulty walking  Onset Date: 24  PT Insurance Information: Medicare/Medicaid  Total # of Visits Approved: 8   Total # of Visits to Date:   No Show: 0  Canceled Appointment: 0     Subjective  Subjective: Pt reports he can tell a difference in swelling in RLE, but not LLE. Still having increased swelling in L foot  Additional Pertinent Hx: HTN, Bladder, arthritis, circulation problems    Lymph Assessment  Skin Integumentary:   Skin Integrity: Intact  Pitting Area 1 Described: barrington LEs  Pitting Scale Area 1: N/A (Brawny)  Skin Color: Purple, Hyperpigmentation  Skin Condition/Temp: Dry  Turgor: Shiney/hard  Hair Growth: Absent  Conditions to nail beds: Thick  Edema Rebound: Slow  Stemmer Sign: Positive  Scars Present: No    Signs of Constriction (if applicable):   Skin Breakdown: Yes  Skin Breakdown Size: abrasion  Skin Breakdown Location: medial heel  Skin Breakdown Number: 1  Fistulas / Abnormal Passageway: No  Papilloma (benign tumor arising from an epithelial layer): No  Fibrotic Areas: No  Lymphorrhea: No  Warts: No  Ulcers: No    Stage of Lymphedema: Stage 2: Spontaneously Irreversible Stage  Description: Stage 2 irreversible stage    Lymphedema Classification:   Type: Secondary Lymphedema  Left: Severe     Right: Severe    Exercises:  Exercise 1: HEP: low sodium diet, daily exercise, compression 20-30mmHg, elevation  Exercise 2: Seated exercises B LE x10 YTB  Exercise 3: STS 2x15  Exercise 4: Supine ankle pumps

## 2024-08-16 ENCOUNTER — HOSPITAL ENCOUNTER (OUTPATIENT)
Dept: PHYSICAL THERAPY | Age: 69
Setting detail: THERAPIES SERIES
Discharge: HOME | End: 2024-08-16
Payer: MEDICARE

## 2024-08-16 PROCEDURE — 97110 THERAPEUTIC EXERCISES: CPT

## 2024-08-16 PROCEDURE — 97140 MANUAL THERAPY 1/> REGIONS: CPT

## 2024-08-16 PROCEDURE — 97016 VASOPNEUMATIC DEVICE THERAPY: CPT

## 2024-08-16 NOTE — PROGRESS NOTES
Phone: [phone]                       [facility]          Fax: [phone]                      Outpatient Physical Therapy                                                             Lymphedema Treatment  Date: 2024  Patient: Edmundo Wagner  : 1955  CSN #: [id number]  Referring Physician: Referring Provider (secondary): Jessica Rodriguez NP    Diagnosis: Lymphedema I89.0, Cellulitis and abscess of leg L03.119, L02.419, Barrington LE edema R 60.0    Treatment Diagnosis: difficulty walking  Onset Date: 24  PT Insurance Information: Medicare/Medicaid  Total # of Visits Approved: 8   Total # of Visits to Date: 12  No Show: 0  Canceled Appointment: 0     Subjective  Subjective: Pt continues to report increased swelling in LLE, especially in L foot  Additional Pertinent Hx: HTN, Bladder, arthritis, circulation problems    Lymph Assessment  Skin Integumentary:   Skin Integrity: Intact  Pitting Area 1 Described: barrington LEs  Pitting Scale Area 1: N/A (Brawny)  Skin Color: Hyperpigmentation, Purple  Skin Condition/Temp: Dry  Turgor: Shiney/hard  Hair Growth: Absent  Edema Rebound: Slow  Stemmer Sign: Positive  Scars Present: No    Signs of Constriction (if applicable):   Skin Breakdown: Yes  Skin Breakdown Size: abrasion  Skin Breakdown Location: medial heel  Skin Breakdown Number: 1  Fistulas / Abnormal Passageway: No  Papilloma (benign tumor arising from an epithelial layer): No  Fibrotic Areas: No  Lymphorrhea: No  Warts: No  Ulcers: No    Stage of Lymphedema: Stage 2: Spontaneously Irreversible Stage  Description: Stage 2 irreversible stage    Lymphedema Classification:   Type: Secondary Lymphedema  Left: Severe     Right: Severe    Measurements: Area Measured: R LE, L LE, Trunk (Abdominal: 137.5cm)      Right Measurements Left Measurements   R LE Pre Girth Measurement (cm)  5th Tuberosity (cm): 29.8  Lateral malleolus: 33  Calf (cm): 45.5  Mid Knee (cm): 50  Thigh (cm): 57.7  Total Girth (cm):

## 2024-08-20 ENCOUNTER — HOSPITAL ENCOUNTER (OUTPATIENT)
Dept: PHYSICAL THERAPY | Age: 69
Setting detail: THERAPIES SERIES
Discharge: HOME | End: 2024-08-20
Payer: MEDICARE

## 2024-08-20 PROCEDURE — 97016 VASOPNEUMATIC DEVICE THERAPY: CPT

## 2024-08-20 PROCEDURE — 97110 THERAPEUTIC EXERCISES: CPT

## 2024-08-20 PROCEDURE — 97140 MANUAL THERAPY 1/> REGIONS: CPT

## 2024-08-21 NOTE — PROGRESS NOTES
Phone: [phone]                       [facility]          Fax: [phone]                      Outpatient Physical Therapy                                                             Lymphedema Treatment  Date: 2024  Patient: Edmundo Wagner  : 1955  CSN #: [id number]  Referring Physician: Referring Provider (secondary): Jessica Rodriguez NP    Diagnosis: Lymphedema I89.0, Cellulitis and abscess of leg L03.119, L02.419, Barrington LE edema R 60.0    Treatment Diagnosis: difficulty walking  Onset Date: 24  PT Insurance Information: Medicare/Medicaid  Total # of Visits Approved: 8   Total # of Visits to Date: 13  No Show: 0  Canceled Appointment: 0     Subjective  Subjective: Pt. reports he is noticing work boots going on easier.  Additional Pertinent Hx: HTN, Bladder, arthritis, circulation problems    Skin Integumentary:   Skin Integrity: Intact  Skin Color: Hyperpigmentation, Purple  Skin Condition/Temp: Dry  Turgor: Shiney/hard    Exercises:  Exercise 1: HEP: low sodium diet, daily exercise, compression 20-30mmHg, elevation  Exercise 2: Seated exercises B LE x15  Exercise 3: STS 2x15  Exercise 4: Supine ankle pumps and heel slides x10 e    Manual:  Other: MLD to B LEs  Skin Integumentary  Skin Color: Hyperpigmentation, Purple  Skin Condition/Temp: Dry  Skin Integrity: Intact  Turgor: Shiney/hard  RLE Complete Decongestion Therapy  Scale: Stage 2  Lymph Drainage Pattern: Lower extremity  Compression Technique: Vaso-pneumatic pump  Force (mmHg): 50 mmHg  Duration: 30 minutes  LLE Complete Decongestion Therapy  Scale: Stage 2  Lymph Drainage Pattern: Lower extremity  Compression Technique: Vaso-pneumatic pump  Force (mmHg): 50 mmHg  Duration : 30 minutes  Other Decongestion Therapy  Force (mmHg): 50 mmHg  Duration : 30 minutes  Skin Integumentary  Skin Color: Hyperpigmentation, Purple  Skin Condition/Temp: Dry  Skin Integrity: Intact  Turgor: Shiney/hard  Other Decongestion

## 2024-08-23 ENCOUNTER — HOSPITAL ENCOUNTER (OUTPATIENT)
Dept: PHYSICAL THERAPY | Age: 69
Setting detail: THERAPIES SERIES
Discharge: HOME | End: 2024-08-23
Payer: MEDICARE

## 2024-08-23 PROCEDURE — 97140 MANUAL THERAPY 1/> REGIONS: CPT

## 2024-08-23 PROCEDURE — 97110 THERAPEUTIC EXERCISES: CPT

## 2024-08-23 PROCEDURE — 97016 VASOPNEUMATIC DEVICE THERAPY: CPT

## 2024-08-26 ENCOUNTER — HOSPITAL ENCOUNTER (OUTPATIENT)
Dept: PHYSICAL THERAPY | Age: 69
Setting detail: THERAPIES SERIES
Discharge: HOME | End: 2024-08-26
Payer: MEDICARE

## 2024-08-26 PROCEDURE — 97110 THERAPEUTIC EXERCISES: CPT

## 2024-08-26 PROCEDURE — 97016 VASOPNEUMATIC DEVICE THERAPY: CPT

## 2024-08-26 PROCEDURE — 97140 MANUAL THERAPY 1/> REGIONS: CPT

## 2024-08-26 NOTE — PROGRESS NOTES
Phone: [phone]                       [facility]          Fax: [phone]                      Outpatient Physical Therapy                                                             Lymphedema Treatment  Date: 2024  Patient: Edmundo Wagner  : 1955  CSN #: [id number]  Referring Physician: Referring Provider (secondary): Jessica Rodriguez NP    Diagnosis: Lymphedema I89.0, Cellulitis and abscess of leg L03.119, L02.419, Barrington LE edema R 60.0    Treatment Diagnosis: difficulty walking  Onset Date: 24  PT Insurance Information: Medicare/Medicaid  Total # of Visits Approved: 20   Total # of Visits to Date: 15  No Show: 0  Canceled Appointment: 0     Subjective  Subjective: Pt reports some pain but does not rate at this time. Pt states he can tell that his shoes are going on easier.  Additional Pertinent Hx: HTN, Bladder, arthritis, circulation problems    Lymph Assessment  Skin Integumentary:   Pitting Area 1 Described: barrington LEs  Pitting Scale Area 1: N/A (Brawny)  Skin Color: Purple, Hyperpigmentation  Skin Texture: Hyperkeratosis  Skin Condition/Temp: Dry  Turgor: Shiney/hard  Hair Growth: Absent  Edema Rebound: Slow  Stemmer Sign: Positive  Scars Present: No    Signs of Constriction (if applicable):   Skin Breakdown: Yes  Skin Breakdown Size: abrasion  Skin Breakdown Location: medial heel  Skin Breakdown Number: 1  Fistulas / Abnormal Passageway: No  Papilloma (benign tumor arising from an epithelial layer): No  Fibrotic Areas: No  Lymphorrhea: No  Warts: No  Ulcers: No    Stage of Lymphedema: Stage 2: Spontaneously Irreversible Stage  Description: Stage 2 irreversible stage    Lymphedema Classification:   Type: Secondary Lymphedema  Left: Severe     Right: Severe    Exercises:  Exercise 1: HEP: low sodium diet, daily exercise, compression 20-30mmHg, elevation  Exercise 2: Seated exercises B LE x15  Exercise 3: STS x10, no UE support from chair  Exercise 5: SciFit Lvl 2 x8

## 2024-08-29 ENCOUNTER — HOSPITAL ENCOUNTER (OUTPATIENT)
Dept: PHYSICAL THERAPY | Age: 69
Setting detail: THERAPIES SERIES
Discharge: HOME | End: 2024-08-29
Payer: MEDICARE

## 2024-08-29 PROCEDURE — 97140 MANUAL THERAPY 1/> REGIONS: CPT

## 2024-08-29 PROCEDURE — 97110 THERAPEUTIC EXERCISES: CPT

## 2024-08-29 PROCEDURE — 97016 VASOPNEUMATIC DEVICE THERAPY: CPT

## 2024-08-29 NOTE — PROGRESS NOTES
Phone: [phone]                       [facility]          Fax: [phone]                      Outpatient Physical Therapy                                                             Lymphedema Treatment  Date: 2024  Patient: Edmundo Wagner  : 1955  [facility] #: [id number]  Referring Physician: Referring Provider (secondary): Jessica Rodriguez NP    Diagnosis: Lymphedema I89.0, Cellulitis and abscess of leg L03.119, L02.419, Barrington LE edema R 60.0    Treatment Diagnosis: difficulty walking  Onset Date: 24  PT Insurance Information: Medicare/Medicaid  Total # of Visits Approved: 20   Total # of Visits to Date: 16  No Show: 0  Canceled Appointment: 0     Subjective  Subjective: Pt. reports he feel slR LE looking better but L L estill has some increased edema.  Additional Pertinent Hx: HTN, Bladder, arthritis, circulation problems      Skin Integumentary:   Skin Integrity: Intact  Pitting Area 1 Described: barrington LEs  Pitting Scale Area 1: N/A (Brawny)  Skin Color: Hyperpigmentation, Purple  Skin Texture: Hyperkeratosis  Skin Condition/Temp: Dry  Turgor: Shiney/hard  Hair Growth: Absent  Edema Rebound: Slow  Stemmer Sign: Positive  Scars Present: No    Signs of Constriction (if applicable):   Skin Breakdown: No  Fistulas / Abnormal Passageway: No  Papilloma (benign tumor arising from an epithelial layer): No  Fibrotic Areas: No  Lymphorrhea: No  Warts: No  Ulcers: No    Stage of Lymphedema: Stage 2: Spontaneously Irreversible Stage  Description: Stage 2 irreversible stage    Lymphedema Classification:   Type: Secondary Lymphedema  Left: Severe     Right: Severe      Exercises:  Exercise 1: HEP: low sodium diet, daily exercise, compression 20-30mmHg, elevation  Exercise 2: Seated exercises B LE x15  Exercise 3: STS x10, no UE support from chair  Exercise 4: Supine ankle pumps and heel slides x10 e  Exercise 5: SciFit Lvl 2 x8 min  Exercise 6: Seated GTB x15 ex    Manual:  Other: MLD to B LEs    control edema-progressing  Long Term Goal 2: Patient will be fitted for compression pumps prn to manage lymphedema-MET  Long Term Goal 3: Patient will present with decreased edema of arcadio LEs by 2-3 cm, in circumference, to return to PLOF-progressing  Patient Goals : decrease leg swelling    Minutes Tracking:  Time In: 1402  Time Out: 1500  Minutes: 58       Sangeetha Siu PTA,    Date: 8/29/2024  Electronically signed by Sangeetha Siu PTA on 8/29/2024 at 3:33 PM

## 2024-09-04 ENCOUNTER — HOSPITAL ENCOUNTER (OUTPATIENT)
Dept: PHYSICAL THERAPY | Age: 69
Setting detail: THERAPIES SERIES
Discharge: HOME | End: 2024-09-04
Payer: MEDICARE

## 2024-09-04 PROCEDURE — 97110 THERAPEUTIC EXERCISES: CPT

## 2024-09-04 PROCEDURE — 97016 VASOPNEUMATIC DEVICE THERAPY: CPT

## 2024-09-04 PROCEDURE — 97140 MANUAL THERAPY 1/> REGIONS: CPT

## 2024-09-04 NOTE — PROGRESS NOTES
be initiated in lymphedema management for improved mobility and function.-MET    Long Term Goals  Time Frame for Long Term Goals : 4 weeks  Long Term Goal 1: Patient is to be independent and compliant with MLD of arcadio LEs in order to control edema-progressing  Long Term Goal 2: Patient will be fitted for compression pumps prn to manage lymphedema-MET  Long Term Goal 3: Patient will present with decreased edema of arcadio LEs by 2-3 cm, in circumference, to return to PLOF-progressing      Patient Goals : decrease leg swelling      Minutes Tracking:  Time In: 1358  Time Out: 1500  Minutes: 62  Timed Code Treatment Minutes: 60 Minutes    Mirian Teran PTA,    Date: 9/4/2024  Electronically signed by Mirian Teran PTA on 9/4/2024 at 3:06 PM

## 2024-09-06 ENCOUNTER — HOSPITAL ENCOUNTER (OUTPATIENT)
Dept: PHYSICAL THERAPY | Age: 69
Setting detail: THERAPIES SERIES
Discharge: HOME | End: 2024-09-06
Payer: MEDICARE

## 2024-09-06 PROCEDURE — 97016 VASOPNEUMATIC DEVICE THERAPY: CPT

## 2024-09-06 PROCEDURE — 97140 MANUAL THERAPY 1/> REGIONS: CPT

## 2024-09-06 PROCEDURE — 97110 THERAPEUTIC EXERCISES: CPT

## 2024-09-06 NOTE — PROGRESS NOTES
Hyperpigmentation, Purple  Skin Condition/Temp: Dry  Skin Integrity: Intact  Turgor: Shiney/hard  Hair Growth: Absent  Conditions to nail beds: Thick  RLE Complete Decongestion Therapy  Scale: Stage 2  Lymph Drainage Pattern: Lower extremity  Compression Technique: Vaso-pneumatic pump  Force (mmHg): 55 mmHg  Duration: 30 minutes  LLE Complete Decongestion Therapy  Scale: Stage 2  Lymph Drainage Pattern: Lower extremity  Compression Technique: Vaso-pneumatic pump  Force (mmHg): 55 mmHg  Duration : 30 minutes  Other Decongestion Therapy  Force (mmHg): 55 mmHg  Duration : 30 minutes  Skin Integumentary  Skin Color: Hyperpigmentation, Purple  Skin Condition/Temp: Dry  Skin Integrity: Intact  Turgor: Shiney/hard  Hair Growth: Absent  Conditions to nail beds: Thick  Other Decongestion Therapy  Force (mmHg): 55 mmHg  Duration : 30 minutes    Assessment  Assessment: Coninued with MLD to BLEs followed by BLE compression pumps at 55mmHg for 30' with no adverse reactions noted. Will continue  Therapy Prognosis: Good  Decision Making: Medium Complexity    Patient Education  Patient Education: HEP and lymph management  Pt verbalized/demonstrated good understanding:     [x] Yes         [] No, pt required further clarification.    Goals  Short Term Goals  Time Frame for Short Term Goals: 2 weeks  Short Term Goal 1: Instruct in HEP to include instruction in lymphedema symptoms, treatment, and management-Met  Short Term Goal 2: Patient will be initiated in lymphedema management for improved mobility and function.-MET    Long Term Goals  Time Frame for Long Term Goals : 4 weeks  Long Term Goal 1: Patient is to be independent and compliant with MLD of arcadio LEs in order to control edema-progressing  Long Term Goal 2: Patient will be fitted for compression pumps prn to manage lymphedema-MET  Long Term Goal 3: Patient will present with decreased edema of arcadio LEs by 2-3 cm, in circumference, to return to PLOF-progressing      Patient Goals

## 2024-09-10 ENCOUNTER — HOSPITAL ENCOUNTER (OUTPATIENT)
Dept: PHYSICAL THERAPY | Age: 69
Setting detail: THERAPIES SERIES
Discharge: HOME | End: 2024-09-10
Payer: MEDICARE

## 2024-09-10 PROCEDURE — 97110 THERAPEUTIC EXERCISES: CPT

## 2024-09-10 PROCEDURE — 97016 VASOPNEUMATIC DEVICE THERAPY: CPT

## 2024-09-10 PROCEDURE — 97140 MANUAL THERAPY 1/> REGIONS: CPT

## 2024-09-12 ENCOUNTER — HOSPITAL ENCOUNTER (OUTPATIENT)
Dept: PHYSICAL THERAPY | Age: 69
Setting detail: THERAPIES SERIES
Discharge: HOME | End: 2024-09-12
Payer: MEDICARE

## 2024-09-12 PROCEDURE — 97110 THERAPEUTIC EXERCISES: CPT

## 2024-09-12 PROCEDURE — 97016 VASOPNEUMATIC DEVICE THERAPY: CPT

## 2024-09-12 PROCEDURE — 97140 MANUAL THERAPY 1/> REGIONS: CPT

## 2024-09-18 ENCOUNTER — HOSPITAL ENCOUNTER (OUTPATIENT)
Dept: PHYSICAL THERAPY | Age: 69
Setting detail: THERAPIES SERIES
Discharge: HOME | End: 2024-09-18
Payer: MEDICARE

## 2024-09-18 PROCEDURE — 97016 VASOPNEUMATIC DEVICE THERAPY: CPT

## 2024-09-18 PROCEDURE — 97140 MANUAL THERAPY 1/> REGIONS: CPT

## 2024-09-18 PROCEDURE — 97110 THERAPEUTIC EXERCISES: CPT

## 2024-09-20 ENCOUNTER — HOSPITAL ENCOUNTER (OUTPATIENT)
Dept: PHYSICAL THERAPY | Age: 69
Setting detail: THERAPIES SERIES
Discharge: HOME | End: 2024-09-20
Payer: MEDICARE

## 2024-09-20 PROCEDURE — 97016 VASOPNEUMATIC DEVICE THERAPY: CPT

## 2024-09-20 PROCEDURE — 97110 THERAPEUTIC EXERCISES: CPT

## 2024-09-20 PROCEDURE — 97140 MANUAL THERAPY 1/> REGIONS: CPT

## 2024-09-25 ENCOUNTER — HOSPITAL ENCOUNTER (OUTPATIENT)
Dept: PHYSICAL THERAPY | Age: 69
Setting detail: THERAPIES SERIES
Discharge: HOME | End: 2024-09-25
Payer: MEDICARE

## 2024-09-25 PROCEDURE — 97140 MANUAL THERAPY 1/> REGIONS: CPT

## 2024-09-25 PROCEDURE — 97016 VASOPNEUMATIC DEVICE THERAPY: CPT

## 2024-09-25 PROCEDURE — 97110 THERAPEUTIC EXERCISES: CPT

## 2024-09-27 ENCOUNTER — HOSPITAL ENCOUNTER (OUTPATIENT)
Dept: PHYSICAL THERAPY | Age: 69
Setting detail: THERAPIES SERIES
Discharge: HOME | End: 2024-09-27
Payer: MEDICARE

## 2024-09-27 PROCEDURE — 97110 THERAPEUTIC EXERCISES: CPT

## 2024-09-27 PROCEDURE — 97140 MANUAL THERAPY 1/> REGIONS: CPT

## 2024-09-27 PROCEDURE — 97016 VASOPNEUMATIC DEVICE THERAPY: CPT

## 2024-10-28 ENCOUNTER — HOSPITAL ENCOUNTER (OUTPATIENT)
Dept: PHYSICAL THERAPY | Age: 69
Setting detail: THERAPIES SERIES
Discharge: HOME | End: 2024-10-28
Payer: MEDICARE

## 2024-10-28 PROCEDURE — 97530 THERAPEUTIC ACTIVITIES: CPT

## 2024-10-28 NOTE — PROGRESS NOTES
verbalized/demonstrated good understanding:     [x] Yes         [] No, pt required further clarification.         Goals  Short Term Goals  Time Frame for Short Term Goals: 2 weeks  Short Term Goal 1: Instruct in HEP to include instruction in lymphedema symptoms, treatment, and management-Met  Short Term Goal 2: Patient will be initiated in lymphedema management for improved mobility and function.-MET    Long Term Goals  Time Frame for Long Term Goals : 4 weeks  Long Term Goal 1: Patient is to be independent and compliant with MLD of arcadio LEs in order to control edema-progressing  Long Term Goal 2: Patient will be fitted for compression pumps prn to manage lymphedema-MET  Long Term Goal 3: Patient will present with decreased edema of arcadio LEs by 2-3 cm, in circumference, to return to PLOF-progressing      Patient Goals : decrease leg swelling        Minutes Tracking:  Time In: 1333  Time Out: 1354  Minutes: 21       Medina Nino PTA,    Date: 10/28/2024  Electronically signed by Medina Nino PTA on 10/28/2024 at 4:48 PM     Thank  you for the opportunity of providing care to your patient. Please sign in acknowledgement of lymphedema treatment for vasopneumatic compression pump approval.    Physician/Provider Signature: ______________________________________________          Date: ____________________

## 2024-10-30 NOTE — PLAN OF CARE
[facility]           Phone: [phone]             Outpatient Physical Therapy  Fax: [phone]                                           Date: 10/28/2024  Patient: Edmundo Wagner : 1955 [facility] #: [id number]   Referring Physician: Jessica Mercedes APRN - NP      [] Plan of Care   [x] Updated Plan of Care    Dates of Service to Include: 10/28/2024 to 24    Diagnosis:  Lymphedema I89.0, Cellulitis and abscess of leg L03.119, L02.419, [place] LE edema R 60.0     Rehab (Treatment) Diagnosis:  difficulty walking         Onset Date:  24    Attendance  Total # of Visits to Date: 25 No Show: 0 Canceled Appointment: 0    Assessment  Assessment: Patient has attended 25 PT visits for B LE lymphedema and has met goals for using home pump and is making good progress toward goals for decreased B LE girth and would benefit from continued PT to meet remaining goals and return to PLOF.      Goals  Short Term Goals  Time Frame for Short Term Goals: 2 weeks  Short Term Goal 1: Instruct in HEP to include instruction in lymphedema symptoms, treatment, and management-Met  Short Term Goal 2: Patient will be initiated in lymphedema management for improved mobility and function.-MET  Long Term Goals  Time Frame for Long Term Goals : 4 weeks  Long Term Goal 1: Patient is to be independent and compliant with MLD of barrington LEs in order to control edema-progressing  Long Term Goal 2: Patient will be fitted for compression pumps prn to manage lymphedema-MET  Long Term Goal 3: Patient will present with decreased edema of barrington LEs by 2-3 cm, in circumference, to return to PLOF-progressing     Prognosis  Therapy Prognosis: Good    Treatment Plan   Plan Frequency: 2  Plan weeks: 4  [] HP/CP      [] Electrical Stim   [x] Therapeutic Exercise      [x] Gait Training  [] Aquatics   [] Ultrasound         [x] Patient Education/HEP   [x] Manual

## 2025-03-04 ENCOUNTER — HOSPITAL ENCOUNTER (OUTPATIENT)
Age: 70
Discharge: HOME | End: 2025-03-06
Payer: MEDICARE

## 2025-03-04 ENCOUNTER — HOSPITAL ENCOUNTER (OUTPATIENT)
Dept: GENERAL RADIOLOGY | Age: 70
Discharge: HOME | End: 2025-03-06
Payer: MEDICARE

## 2025-03-04 DIAGNOSIS — M54.40 MIDLINE LOW BACK PAIN WITH SCIATICA, SCIATICA LATERALITY UNSPECIFIED, UNSPECIFIED CHRONICITY: ICD-10-CM

## 2025-03-04 PROCEDURE — 72110 X-RAY EXAM L-2 SPINE 4/>VWS: CPT

## 2025-03-27 ENCOUNTER — TRANSCRIBE ORDERS (OUTPATIENT)
Dept: ADMINISTRATIVE | Age: 70
End: 2025-03-27

## 2025-03-27 DIAGNOSIS — R10.32 LLQ ABDOMINAL PAIN: Primary | ICD-10-CM

## 2025-04-04 ENCOUNTER — HOSPITAL ENCOUNTER (OUTPATIENT)
Dept: ULTRASOUND IMAGING | Age: 70
Discharge: HOME | End: 2025-04-04
Payer: MEDICARE

## 2025-04-04 DIAGNOSIS — R10.32 LLQ ABDOMINAL PAIN: ICD-10-CM

## 2025-04-04 PROCEDURE — 76705 ECHO EXAM OF ABDOMEN: CPT
